# Patient Record
Sex: MALE | Race: BLACK OR AFRICAN AMERICAN | NOT HISPANIC OR LATINO | Employment: OTHER | URBAN - METROPOLITAN AREA
[De-identification: names, ages, dates, MRNs, and addresses within clinical notes are randomized per-mention and may not be internally consistent; named-entity substitution may affect disease eponyms.]

---

## 2019-10-17 ENCOUNTER — HISTORICAL (OUTPATIENT)
Dept: ADMINISTRATIVE | Facility: HOSPITAL | Age: 59
End: 2019-10-17

## 2019-12-10 ENCOUNTER — HISTORICAL (OUTPATIENT)
Dept: RADIOLOGY | Facility: HOSPITAL | Age: 59
End: 2019-12-10

## 2020-07-21 ENCOUNTER — HISTORICAL (OUTPATIENT)
Dept: ADMINISTRATIVE | Facility: HOSPITAL | Age: 60
End: 2020-07-21

## 2021-01-19 ENCOUNTER — HISTORICAL (OUTPATIENT)
Dept: ADMINISTRATIVE | Facility: HOSPITAL | Age: 61
End: 2021-01-19

## 2021-03-10 ENCOUNTER — HISTORICAL (OUTPATIENT)
Dept: RADIOLOGY | Facility: HOSPITAL | Age: 61
End: 2021-03-10

## 2021-05-25 LAB
ABS NEUT (OLG): 9.2 X10(3)/MCL (ref 2.1–9.2)
BUN SERPL-MCNC: 19.1 MG/DL (ref 8.4–25.7)
CALCIUM SERPL-MCNC: 9.5 MG/DL (ref 8.4–10.2)
CHLORIDE SERPL-SCNC: 102 MMOL/L (ref 98–107)
CO2 SERPL-SCNC: 30 MMOL/L (ref 22–29)
CREAT SERPL-MCNC: 1.18 MG/DL (ref 0.72–1.25)
CREAT/UREA NIT SERPL: 16
ERYTHROCYTE [DISTWIDTH] IN BLOOD BY AUTOMATED COUNT: 14 % (ref 11.5–17)
GLUCOSE SERPL-MCNC: 95 MG/DL (ref 74–100)
HCT VFR BLD AUTO: 43.9 % (ref 42–52)
HGB BLD-MCNC: 14.4 GM/DL (ref 14–18)
MCH RBC QN AUTO: 29.9 PG (ref 27–31)
MCHC RBC AUTO-ENTMCNC: 32.8 GM/DL (ref 33–36)
MCV RBC AUTO: 91.1 FL (ref 80–94)
NRBC BLD AUTO-RTO: 0 % (ref 0–0.2)
PLATELET # BLD AUTO: 223 X10(3)/MCL (ref 130–400)
PMV BLD AUTO: 9.1 FL (ref 7.4–10.4)
POTASSIUM SERPL-SCNC: 4.3 MMOL/L (ref 3.5–5.1)
RBC # BLD AUTO: 4.82 X10(6)/MCL (ref 4.7–6.1)
SODIUM SERPL-SCNC: 139 MMOL/L (ref 136–145)
WBC # SPEC AUTO: 13.9 X10(3)/MCL (ref 4.5–11.5)

## 2021-06-09 ENCOUNTER — HISTORICAL (OUTPATIENT)
Dept: SURGERY | Facility: HOSPITAL | Age: 61
End: 2021-06-09

## 2022-04-11 ENCOUNTER — HISTORICAL (OUTPATIENT)
Dept: ADMINISTRATIVE | Facility: HOSPITAL | Age: 62
End: 2022-04-11
Payer: MEDICAID

## 2022-04-24 VITALS
HEIGHT: 72 IN | SYSTOLIC BLOOD PRESSURE: 151 MMHG | DIASTOLIC BLOOD PRESSURE: 93 MMHG | BODY MASS INDEX: 42.66 KG/M2 | WEIGHT: 315 LBS

## 2022-05-05 NOTE — HISTORICAL [facility]
This is a historical note converted from Herminio. Formatting and pictures may have been removed.  Please reference Herminio for original formatting and attached multimedia. OPERATIVE REPORT  ?  DATE: 6/9/2021  ?  ASSISTANT: Christiano, surgical technician  ?  PREOPERATIVE DIAGNOSIS:  1. ?Left?rotator cuff tear  2. ?Acromioclavicular arthrosis  ?  POSTOPERATIVE DIAGNOSIS:  1. ?Rotator cuff tear  2. ?Acromioclavicular arthrosis  3. ?Intra-articular biceps tendon tear and medial subluxation  ?  PROCEDURES:  1. ?Diagnostic left shoulder arthroscopy  2. ?Arthroscopic biceps tenotomy  3. ?Distal clavicle resection  4. ?Arthroscopic rotator cuff repair with PRP  ?  ANESTHESIA:  General anesthesia with supraclavicular nerve block  ?  BLOOD LOSS:  Less than 10 cc  ?  DVT PROPHYLAXIS:  Aspirin for 10 to 14 days  ?  INSTRUMENTATION:  Arthrex?5.5 mm?peek?swivel lock anchor?for lateral row?rotator cuff repair  ?  PROCEDURE IN DETAIL:  ?  Diagnostic arthroscopy of shoulder  ?  The examination under anesthesia was performed for skin defects and other contraindications and none were found.The patient was carefully placed in a lateral decubitus position. All bony prominences were padded and sterile U-drape was applied. Patients operative extremity was placed in suspension device with 7-10lbs of weight applied. The skin was prepped in normal sterile fashion. A time out was performed to confirm correct operative extremity, allergies, and antibiotic infusion. All portals were made with an 11-blade and an 18-gauge spinal needle was used to help probe and find proper alignment for the portals. After creating posterior portal, an arthroscope was inserted into the glenohumeral joint. A diagnostic arthroscopy was then performed in this sequential order: biceps anchor, rotator interval, subscapularis tendon, superior glenohumeral ligament, middle glenohumeral ligament, inferior glenohumeral ligament, anterior and inferior capsular attachments,  anterior, inferior, and posterior labrum, teres minor tendon, infraspinatus tendon, and supraspinatus tendon, and biceps tendon in the rotator interval.  ?  The certified assistant provided critical assistance by holding instruments including the arthroscope to provide adequate visualization of the procedure, as well as assisting in wound closure.  ?  At the end of the procedure the portals were closed with mastisol around the wound and placement of steri-strips. The patient tolerated the procedure well and taken to the recovery room in good condition.??  ?  Arthroscopic biceps Tenotomy  ?  Viewing through posterior portal, the base of the biceps tendon visualized and subluxed. The tendon was transected with arthroscopic scissors through the anterior portal.  ?  Distal clavicle resection  ?  A distal clavicle resection was then carried out. Viewing from the posterior portal, the shaver was used to debride the acromioclavicular joint and associated soft tissues through the anterior portal. Once cleared of the soft tissue, the cj was used to resect the distal end of the clavicle concentrating initially upon the inferior and distal clavicular spurs. The resection was then carried superiorly to remove the upper end of the clavicle to remove approximately 5-7mm of spurs. Then the cj was used on the acromion to remove 2-3mm of bone. The resection was then measured with a probe to confirm the 8-10mm of resection.?  ?  Arthroscopic rotator cuff repair with PRP  ?  Viewing from the posterior portal we can visualize the rotator cuff tear laterally. ?The greater tuberosity attachment for the rotator cuff was debrided to good bony base. ?An anchor was placed just lateral to the articular surface of the humeral head on the greater tuberosity near the rotator cuff attachment. ?Sutures were then passed through the rotator cuff just medial to the rotator cuff musculo-tendonis interval. ?The sutures were then tied with a sliding  locking knot. ?There was good reapproximation and repair of the rotator cuff to the footprint.  ?  I then placed an 18-gauge spinal needle into the repair site. ?I removed all of the inflow and outflow tubings and injected platelet rich plasma into the repair site.

## 2022-05-05 NOTE — HISTORICAL [facility]
This is a historical note converted from Herminio. Formatting and pictures may have been removed.  Please reference Herminio for original formatting and attached multimedia. Chief Complaint  Pt here for 11 month s/p B/L TKA sx 2/5/20. Pt states that when he is getting in and out of bed his knee hurts  History of Present Illness  This patient approximately?1 year out from bilateral total knee replacements.? He still complains of some pain when he gets in and out of bed.? He also complains of radiating pain down the leg?that?is a shooting pain?and its?strongly associated with his?left knee pain when getting out of bed.? He also complains of left shoulder pain with a previous left rotator cuff repair done?several years ago.? He has a lot of night pain with his left shoulder.? He also complains of?radiating pain in that?left upper extremity as well.  Review of Systems  All review of systems negative except for those stated in the HPI  Physical Exam  Vitals & Measurements  T:?36.2? ?C (Oral)? HR:?78(Peripheral)? RR:?12? BP:?115/74?  HT:?185.00?cm? WT:?150.000?kg? BMI:?43.83?  General: Well-developed, well-nourished.  Neuro: Alert and oriented x 3.  Psych: Normal mood and affect.  CV: Palpable radial pulses.  Resp: Smooth and unlabored.  Skin: No evidence of focal lesions or trauma.  Hem/Imm/Lymph: No evidence of lymphangitis or adenopathy.  Gait: No trendelenburg gait.  DTR: 2+, no hypo or hyperreflexia.  Coordination and Balance: No tremors or abnormal station.  Bilateral?knee Exam:  No obvious deformity. Range of motion from 0-115 degrees. Negative patella grind and equal subluxation of knee cap medial and lateral < 1cm. Negative patella tendon tenderness. Negative Lachman and anterior drawer test. Negative posterior drawer test. Negative varus and valgus stress test. Negative medial joint line tenderness. Negative lateral joint line tenderness. 5/5 strength and normal skin appearance. Sensibility normal.  Left?shoulder  Exam:  No obvious deformity. No medial or lateral scapula winging. Forward flexion to 140 degrees and abduction to 140 degrees. Positive empty can, ?positive Whipple, Positive drop arm test, Ma, impingement, Positive AC joint tenderness. Negative biceps groove tenderness, Positive Camacho?s, Yergason?s, Speed test. Negative Apprehension and Relocation test. 4/5 strength, normal skin appearance and palpable pulses distally. Sensibility normal.  Assessment/Plan  1.?Status post bilateral knee replacements?Z96.653  X-rays demonstrate no signs of implant loosening and appropriate placement of prosthesis.  Ordered:  Office/Outpatient Visit Level 4 Established 24154 , Status post bilateral knee replacements  Radiculopathy, lumbar region  Left rotator cuff tear  [facility], 01/19/21 10:29:00 CST  ?  2.?Radiculopathy, lumbar region?M54.16  ?The patient has a history of lumbar surgery?x2.? A lot of his knee pain symptoms are coming from the?lumbar surgery.? There is not much to do from orthopedic standpoint for that.  Ordered:  Office/Outpatient Visit Level 4 Established [id number] , Status post bilateral knee replacements  Radiculopathy, lumbar region  Left rotator cuff tear  Left cervical radiculopathy, [facility], 01/19/21 10:29:00 CST  ?  3.?Left rotator cuff tear?M75.102  ?He has a history of a previous rotator cuff repair done several years ago.? He continues to have these?continue rotator cuff symptoms. ?For this reason I will order an MRI to rule out a?rotator cuff tear.  Ordered:  MRI Ext Upper Joint Left W/O Contrast, Routine, 01/19/21 10:30:00 CST, Other (please specify), Shoulder pain, rotator cuff disorder suspected, nondiagnostic xray, None, Ambulatory, [facility], Rad Type, Order for future visit, Left rotator cuff tear, Schedule this test, Outside Facil...  Office/Outpatient Visit Level 4 Established 47789 , Status post bilateral knee  replacements  Radiculopathy, lumbar region  Left rotator cuff tear  Left cervical radiculopathy, [facility], 01/19/21 10:29:00 CST  ?  4.?Left cervical radiculopathy?M54.12  ?Also think some of his symptoms in the left upper extremity could be  Ordered:  Office/Outpatient Visit Level 4 Established 17403 PC, Status post bilateral knee replacements  Radiculopathy, lumbar region  Left rotator cuff tear  [facility] 10:29:00 CST  ?  Orders:  XR Knee Left 4 or More Views, Routine, 01/19/21 9:31:00 CST, None, Ambulatory, Rad Type, Status post total knee replacement, Not Scheduled, 01/19/21 9:31:00 CST  XR Knee Right 4 or More Views, Routine, 01/19/21 9:31:00 CST, None, Ambulatory, Rad Type, Status post total knee replacement, Not Scheduled, 01/19/21 9:31:00 CST   Problem List/Past Medical History  Ongoing  GERD - Gastro-esophageal reflux disease  High cholesterol  Hypertension  Morbid obesity  Obstructive sleep apnea  Osteoarthritis of both knees  Status post bilateral knee replacements  Status post left rotator cuff repair  Stomach ulcer  Historical  No qualifying data  Procedure/Surgical History  Replacement of Left Knee Joint with Oxidized Zirconium on Polyethylene Synthetic Substitute, Cemented, Open Approach (02/05/2020)  Replacement of Right Knee Joint with Oxidized Zirconium on Polyethylene Synthetic Substitute, Cemented, Open Approach (02/05/2020)  Total Knee Arthroplasty Bilateral (Bilateral) (02/05/2020)  appendectomy  back surgery  cervical fusion  Knee  neck surgery  Shoulder   Medications  acetaminophen-oxycodone 300 mg-5 mg oral tablet, 1 tab(s), Oral, BID, PRN,? ?Not taking  amLODIPine 5 mg oral tablet, 5 mg= 1 tab(s), Oral, Daily  aspirin 81 mg oral Delayed Release (EC) tablet, 81 mg= 1 tab(s), Oral, BID,? ?Not taking: Last Dose Date/Time Unknown  dicyclomine 10 mg oral capsule, 10 mg= 1 cap(s), Oral, TID  gabapentin 300 mg oral capsule, 600  mg= 2 cap(s), Oral, At Bedtime,? ?Not taking: Last Dose Date/Time Unknown  Pantoprazole 40 mg ORAL EC-Tablet, 40 mg= 1 tab(s), Oral, Daily  ramipril 10 mg oral capsule, 10 mg= 1 cap(s), Oral, Daily  simvastatin 20 mg oral tablet, 20 mg= 1 tab(s), Oral, qPM  Allergies  No Known Allergies  Social History  Abuse/Neglect  No, 01/19/2021  Alcohol  Current, Liquor, 1-2 times per month, 12/19/2019  Employment/School  Disabled, 12/19/2019  Exercise  Home/Environment  Lives with Spouse., 12/19/2019  Nutrition/Health  Regular, 12/19/2019  Substance Use  Never, 12/19/2019  Tobacco  Never (less than 100 in lifetime), No, 01/19/2021  Family History  Cancer: Mother.  Diabetes mellitus.....: Mother and Father.  Health Maintenance  Health Maintenance  ???Pending?(in the next year)  ??? ??OverDue  ??? ? ? ?Influenza Vaccine due??10/01/20??and every 1??day(s)  ??? ??Due?  ??? ? ? ?Alcohol Misuse Screening due??01/02/21??and every 1??year(s)  ??? ? ? ?Colorectal Screening due??01/19/21??Unknown Frequency  ??? ? ? ?Depression Screening due??01/19/21??Unknown Frequency  ??? ? ? ?Hypertension Management-Education due??01/19/21??and every 1??year(s)  ??? ? ? ?Medicare Annual Wellness Exam due??01/19/21??and every 1??year(s)  ??? ? ? ?Tetanus Vaccine due??01/19/21??and every 10??year(s)  ??? ? ? ?Zoster Vaccine due??01/19/21??Unknown Frequency  ??? ??Due In Future?  ??? ? ? ?Hypertension Management-BMP not due until??02/06/21??and every 1??year(s)  ??? ? ? ?Aspirin Therapy for CVD Prevention not due until??02/11/21??and every 1??year(s)  ??? ? ? ?ADL Screening not due until??04/21/21??and every 1??year(s)  ??? ? ? ?Blood Pressure Screening not due until??07/21/21??and every 1??year(s)  ??? ? ? ?Hypertension Management-Blood Pressure not due until??07/21/21??and every 1??year(s)  ??? ? ? ?Obesity Screening not due until??01/01/22??and every 1??year(s)  ???Satisfied?(in the past 1 year)  ??? ??Satisfied?  ??? ? ? ?ADL Screening  on??04/21/20.??Satisfied by Lourdes Drake  ??? ? ? ?Alcohol Misuse Screening on??04/21/20.??Satisfied by Lourdes Drake  ??? ? ? ?Aspirin Therapy for CVD Prevention on??02/11/20.??Satisfied by Yuridia Griffiths RN  ??? ? ? ?Blood Pressure Screening on??01/19/21.??Satisfied by Ruben Patricio  ??? ? ? ?Body Mass Index Check on??01/19/21.??Satisfied by Ruben Patricio  ??? ? ? ?Depression Screening on??01/19/21.??Satisfied by Ruben Patricio  ??? ? ? ?Diabetes Screening on??02/07/20.??Satisfied by Isrrael Thapa  ??? ? ? ?Hypertension Management-Blood Pressure on??01/19/21.??Satisfied by Ruben Patricio  ??? ? ? ?Hypertension Management-BMP on??02/07/20.??Satisfied by Isrrael Thapa  ??? ? ? ?Influenza Vaccine on??01/19/21.??Satisfied by Pascual Mathews  ??? ? ? ?Obesity Screening on??01/19/21.??Satisfied by Ruben Patricio  ?

## 2022-05-05 NOTE — HISTORICAL [facility]
This is a historical note converted from Herminio. Formatting and pictures may have been removed.  Please reference Herminio for original formatting and attached multimedia. Chief Complaint  5 month s/p Brian TKA here for follow up with xrays. Doing good has pain with different activity. Xrays today. ?sx 2/5/20.  History of Present Illness  Patient status post bilateral knee replacements on?2/5/2020. This patient is doing well. ?He has minimal symptoms in his knee. ?Only complaint really has is when the?is issues with deep bending?but otherwise he is doing well.  Review of Systems  GENERAL: No fevers, chills, unexpected weight loss or gain  MUSCULOSKELETAL: Joint pain, extremity pain  Physical Exam  General: Well-developed, well-nourished.  Neuro: Alert and oriented x 3.  Psych: Normal mood and affect.  Bilateral?knee Exam:  No obvious deformity. Range of motion from 0-115 degrees. Negative patella grind and equal subluxation of knee cap medial and lateral < 1cm. Negative patella tendon tenderness. Negative Lachman and anterior drawer test. Negative posterior drawer test. Negative varus and valgus stress test. Negative medial joint line tenderness. Negative lateral joint line tenderness. 4-/5 strength and normal skin appearance. Sensibility normal. [1]  Left?elbow Exam:  No obvious deformity. Range of motion is 0 to 130 degrees. Negative varus and valgus stress test. Supination and pronation to 90 degrees. Negative tenderness to palpation over the lateral epicondyle. Negative tenderness to palpation over the medial epicondyle. ?Positive Tinel?s test. ?Paresthesias in ring and little finger that are exacerbated by deep flexion of the elbow. ?No olecranon tenderness. 5/5 strength, normal skin appearance and palpable pulses distally. Sensibility normal.  Assessment/Plan  1.?S/p total knee replacement, bilateral?Z96.653  ??He will continue with his normal ADLs. ?I will see him back in 6 months with?one last set of x-rays and  everything looks good that should be his last visit.  Ordered:  Clinic Follow up, *Est. 01/21/21 3:00:00 CST, Order for future visit, S/p total knee replacement, bilateral, [facility]  Office/Outpatient Visit Level 3 Established 22543 PC, S/p total knee replacement, bilateral, [facility], 07/21/20 9:32:00 CDT  XR Knee Left 3 Views, Routine, 07/21/20 9:30:00 CDT, None, Ambulatory, Rad Type, S/p total knee replacement, bilateral, Not Scheduled, 07/21/20 9:30:00 CDT  XR Knee Right 4 or More Views, Routine, 07/21/20 9:31:00 CDT, None, Ambulatory, Rad Type, S/p total knee replacement, bilateral, Not Scheduled, 07/21/20 9:31:00 CDT  ?  2.?Cubital tunnel syndrome on left?G56.22  ?  Referrals  Clinic Follow up, *Est. 01/21/21 3:00:00 CST, Order for future visit, S/p total knee replacement, bilateral, [facility]   Problem List/Past Medical History  Ongoing  GERD - Gastro-esophageal reflux disease  High cholesterol  Hypertension  Morbid obesity  Obstructive sleep apnea  Osteoarthritis of both knees  Status post bilateral knee replacements  Status post left rotator cuff repair  Stomach ulcer  Historical  No qualifying data  Procedure/Surgical History  Replacement of Left Knee Joint with Oxidized Zirconium on Polyethylene Synthetic Substitute, Cemented, Open Approach (02/05/2020)  Replacement of Right Knee Joint with Oxidized Zirconium on Polyethylene Synthetic Substitute, Cemented, Open Approach (02/05/2020)  Total Knee Arthroplasty Bilateral (Bilateral) (02/05/2020)  appendectomy  back surgery  cervical fusion  Knee  neck surgery  Shoulder   Medications  acetaminophen-oxycodone 300 mg-5 mg oral tablet, 1 tab(s), Oral, BID, PRN  amLODIPine 5 mg oral tablet, 5 mg= 1 tab(s), Oral, Daily  aspirin 81 mg oral Delayed Release (EC) tablet, 81 mg= 1 tab(s), Oral, BID  dicyclomine 10 mg oral capsule, 10 mg= 1 cap(s), Oral, TID  gabapentin 300 mg oral capsule, 600 mg= 2 cap(s), Oral, At Bedtime  Pantoprazole 40 mg  ORAL EC-Tablet, 40 mg= 1 tab(s), Oral, Daily  ramipril 10 mg oral capsule, 10 mg= 1 cap(s), Oral, Daily  simvastatin 20 mg oral tablet, 20 mg= 1 tab(s), Oral, qPM  Allergies  No Known Allergies  Social History  Abuse/Neglect  No, 02/20/2020  Alcohol  Current, Liquor, 1-2 times per month, 12/19/2019  Employment/School  Disabled, 12/19/2019  Exercise  Home/Environment  Lives with Spouse., 12/19/2019  Nutrition/Health  Regular, 12/19/2019  Substance Use  Never, 12/19/2019  Tobacco  Never (less than 100 in lifetime), No, 02/20/2020  Family History  Cancer: Mother.  Diabetes mellitus.....: Mother and Father.  Health Maintenance  Health Maintenance  ???Pending?(in the next year)  ??? ??Due?  ??? ? ? ?Colorectal Screening due??07/21/20??and every?  ??? ? ? ?Depression Screening due??07/21/20??and every?  ??? ? ? ?Hypertension Management-Education due??07/21/20??and every 1??year(s)  ??? ? ? ?Influenza Vaccine due??07/21/20??and every?  ??? ? ? ?Medicare Annual Wellness Exam due??07/21/20??and every 1??year(s)  ??? ? ? ?Tetanus Vaccine due??07/21/20??and every 10??year(s)  ??? ? ? ?Zoster Vaccine due??07/21/20??and every?  ??? ??Due In Future?  ??? ? ? ?Obesity Screening not due until??01/01/21??and every 1??year(s)  ??? ? ? ?Alcohol Misuse Screening not due until??01/02/21??and every 1??year(s)  ??? ? ? ?Hypertension Management-BMP not due until??02/06/21??and every 1??year(s)  ??? ? ? ?Aspirin Therapy for CVD Prevention not due until??02/11/21??and every 1??year(s)  ??? ? ? ?Blood Pressure Screening not due until??02/19/21??and every 1??year(s)  ??? ? ? ?Hypertension Management-Blood Pressure not due until??02/19/21??and every 1??year(s)  ??? ? ? ?Body Mass Index Check not due until??04/21/21??and every 1??year(s)  ??? ? ? ?ADL Screening not due until??04/21/21??and every 1??year(s)  ???Satisfied?(in the past 1 year)  ??? ??Satisfied?  ??? ? ? ?ADL Screening on??04/21/20.??Satisfied by Lourdes Drake  ??? ? ? ?Alcohol  Misuse Screening on??04/21/20.??Satisfied by Lourdes Drake  ??? ? ? ?Aspirin Therapy for CVD Prevention on??02/11/20.??Satisfied by Aye CARLOS, Yuridia CANNON  ??? ? ? ?Blood Pressure Screening on??02/20/20.??Satisfied by Soila Sams LPN  ??? ? ? ?Body Mass Index Check on??04/21/20.??Satisfied by Lourdes Drake  ??? ? ? ?Diabetes Screening on??02/07/20.??Satisfied by Isrrael Thapa  ??? ? ? ?Hypertension Management-Blood Pressure on??02/20/20.??Satisfied by Soila Sams LPN  ??? ? ? ?Hypertension Management-BMP on??02/07/20.??Satisfied by Isrrael Thapa  ??? ? ? ?Influenza Vaccine on??02/05/20.??Satisfied by Rahul CARLOS, Hugh REBOLLAR  ??? ? ? ?Obesity Screening on??04/21/20.??Satisfied by Lourdes Drake  ?  Diagnostic Results  X-rays demonstrate no signs of implant loosening and appropriate placement of prosthesis.     [1]?Office Visit Note; Robert Burgos MD 04/21/2020 08:53 CDT

## 2023-03-30 DIAGNOSIS — M54.16 LUMBAR RADICULOPATHY: ICD-10-CM

## 2023-03-30 DIAGNOSIS — M47.812 CERVICAL OSTEOARTHRITIS: ICD-10-CM

## 2023-03-30 DIAGNOSIS — M43.06 SPONDYLOLYSIS OF LUMBAR REGION: Primary | ICD-10-CM

## 2023-03-30 DIAGNOSIS — M54.12 CERVICAL RADICULITIS: ICD-10-CM

## 2023-04-12 ENCOUNTER — HOSPITAL ENCOUNTER (OUTPATIENT)
Dept: RADIOLOGY | Facility: HOSPITAL | Age: 63
Discharge: HOME | End: 2023-04-12
Attending: PAIN MEDICINE
Payer: MEDICARE

## 2023-04-12 DIAGNOSIS — M54.12 CERVICAL RADICULITIS: ICD-10-CM

## 2023-04-12 DIAGNOSIS — M43.06 SPONDYLOLYSIS OF LUMBAR REGION: ICD-10-CM

## 2023-04-12 DIAGNOSIS — M47.812 CERVICAL OSTEOARTHRITIS: ICD-10-CM

## 2023-04-12 DIAGNOSIS — M54.16 LUMBAR RADICULOPATHY: ICD-10-CM

## 2023-04-12 PROCEDURE — 72141 MRI NECK SPINE W/O DYE: CPT | Mod: TC

## 2023-04-12 PROCEDURE — 72148 MRI LUMBAR SPINE W/O DYE: CPT | Mod: TC
